# Patient Record
Sex: MALE | Employment: FULL TIME | URBAN - METROPOLITAN AREA
[De-identification: names, ages, dates, MRNs, and addresses within clinical notes are randomized per-mention and may not be internally consistent; named-entity substitution may affect disease eponyms.]

---

## 2019-07-31 ENCOUNTER — OFFICE VISIT (OUTPATIENT)
Dept: OPHTHALMOLOGY | Facility: CLINIC | Age: 39
End: 2019-07-31
Payer: OTHER MISCELLANEOUS

## 2019-07-31 DIAGNOSIS — H02.88A MEIBOMIAN GLAND DYSFUNCTION (MGD) OF UPPER AND LOWER LIDS OF BOTH EYES: ICD-10-CM

## 2019-07-31 DIAGNOSIS — Q11.1 ANOPHTHALMOS OF LEFT EYE: ICD-10-CM

## 2019-07-31 DIAGNOSIS — H02.88B MEIBOMIAN GLAND DYSFUNCTION (MGD) OF UPPER AND LOWER LIDS OF BOTH EYES: ICD-10-CM

## 2019-07-31 DIAGNOSIS — H52.01 HYPERMETROPIA OF RIGHT EYE: Primary | ICD-10-CM

## 2019-07-31 ASSESSMENT — EXTERNAL EXAM - LEFT EYE: OS_EXAM: NORMAL

## 2019-07-31 ASSESSMENT — CONF VISUAL FIELD
OS_SUPERIOR_NASAL_RESTRICTION: 1
OD_NORMAL: 1
OS_SUPERIOR_TEMPORAL_RESTRICTION: 1
OS_INFERIOR_TEMPORAL_RESTRICTION: 1
OS_INFERIOR_NASAL_RESTRICTION: 1

## 2019-07-31 ASSESSMENT — SLIT LAMP EXAM - LIDS: COMMENTS: NORMAL

## 2019-07-31 ASSESSMENT — CUP TO DISC RATIO: OD_RATIO: 0.2

## 2019-07-31 ASSESSMENT — TONOMETRY
IOP_METHOD: ICARE
OS_IOP_MMHG: PROS
OD_IOP_MMHG: 22

## 2019-07-31 ASSESSMENT — VISUAL ACUITY
CORRECTION_TYPE: GLASSES
METHOD: SNELLEN - LINEAR
OD_CC+: -1
OS_CC: XX
OD_CC: 20/25-
OD_CC: 20/20

## 2019-07-31 ASSESSMENT — REFRACTION_MANIFEST
OD_SPHERE: +0.75
OD_CYLINDER: SPHERE

## 2019-07-31 ASSESSMENT — EXTERNAL EXAM - RIGHT EYE: OD_EXAM: NORMAL

## 2019-07-31 NOTE — PROGRESS NOTES
History  HPI     Annual Eye Exam     In right eye.  Charactertized as  blurred vision.  Severity is moderate.  Response to treatment was no improvement.  Pain was noted as 0/10.              Comments     left eye prosthesis, last exam 2010  Both eyes hurt, alternating sides. Longstanding.   Using artificial tears both eyes 3 times per week.  Wears glasses full-time.  Removes prosthesis to clean daily with water. Current prosthesis is ~10 years old.  Reports blurry vision in the right eye without glasses. A little better with glasses.          Last edited by Subhash Martines, OD on 7/31/2019  5:34 PM. (History)          Assessment/Plan  (H52.01) Hypermetropia of right eye  (primary encounter diagnosis)  Comment: Hyperopia right eye   Plan: REFRACTION         Educated patient on condition and clinical findings. Dispensed spectacle prescription for full time wear. Educated patient on possibility of adaptation period, if symptoms do not improve return to clinic for further testing.   Stressed importance of full-time wear due to monocular status.    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 Mendes  Plan:  Referred to Dr. Mendes for consultation.    (H02.88A,  H02.88B) Meibomian gland dysfunction (MGD) of upper and lower lids of both eyes  Comment: Mild, minimal clinical findings, infrequently symptomatic  Plan:  Recommended artificial tears as needed for comfort.    Complete documentation of historical and exam elements from today's encounter can  be found in the full encounter summary report (not reduplicated in this progress  note). I personally obtained the chief complaint(s) and history of present illness. I  confirmed and edited as necessary the review of systems, past medical/surgical  history, family history, social history, and examination findings as documented by  others; and I examined the patient myself. I personally reviewed the relevant tests,  images, and reports as documented  above. I formulated and edited as necessary the  assessment and plan and discussed the findings and management plan with the  patient and family.    Subhash Martines OD, FAAO

## 2019-08-02 ENCOUNTER — PRE VISIT (OUTPATIENT)
Dept: OPHTHALMOLOGY | Facility: CLINIC | Age: 39
End: 2019-08-02

## 2019-08-02 NOTE — TELEPHONE ENCOUNTER
FUTURE VISIT INFORMATION      FUTURE VISIT INFORMATION:    Date: 8/26/19    Time: 7:30am    [place] [facility] [place]  REFERRAL INFORMATION:    Referring provider:  Bobbi    Referring providers clinic:  [facility]    Reason for visit/diagnosis  Anophthalmos of left eye    RECORDS REQUESTED FROM:       Clinic name Comments Records Status Imaging Status   [facility] OV/referral Bobbi 7/31/19  OV/Notes 4/20/2009-6/25/10 EPIC

## 2019-08-26 ENCOUNTER — OFFICE VISIT (OUTPATIENT)
Dept: OPHTHALMOLOGY | Facility: CLINIC | Age: 39
End: 2019-08-26
Payer: OTHER MISCELLANEOUS

## 2019-08-26 DIAGNOSIS — Q11.1 ANOPHTHALMOS OF LEFT EYE: Primary | ICD-10-CM

## 2019-08-26 ASSESSMENT — EXTERNAL EXAM - LEFT EYE: OS_EXAM: NORMAL

## 2019-08-26 ASSESSMENT — TONOMETRY
IOP_METHOD: ICARE
OD_IOP_MMHG: 20
OS_IOP_MMHG: PROS

## 2019-08-26 ASSESSMENT — CONF VISUAL FIELD
OD_NORMAL: 1
OS_INFERIOR_NASAL_RESTRICTION: 1
OS_INFERIOR_TEMPORAL_RESTRICTION: 1
OS_SUPERIOR_NASAL_RESTRICTION: 1
OS_SUPERIOR_TEMPORAL_RESTRICTION: 1

## 2019-08-26 ASSESSMENT — VISUAL ACUITY
OD_CC: 20/20
METHOD: SNELLEN - LINEAR
OD_CC+: -2
OS_CC: PROS

## 2019-08-26 ASSESSMENT — REFRACTION_WEARINGRX
OD_CYLINDER: SPHERE
OD_SPHERE: +0.75
OS_SPHERE: BALANCE

## 2019-08-26 ASSESSMENT — SLIT LAMP EXAM - LIDS: COMMENTS: NORMAL

## 2019-08-26 ASSESSMENT — EXTERNAL EXAM - RIGHT EYE: OD_EXAM: NORMAL

## 2019-08-26 NOTE — PROGRESS NOTES
Chief Complaints and History of Present Illnesses   Patient presents with     Consult For     Chief Complaint(s) and History of Present Illness(es)     Consult For     In left eye.  This started 10 years ago.  Occurring constantly.  Since   onset it is stable.  Treatments tried include no treatments.  Pain was   noted as 0/10.      Comments     Anophthalmos of the left eye.    Pt states prosthesis is maybe 10+ years old, no problems. No pain. No   problems with fit. AT PRN each eye.    Xochitl Gregorio, COT COT 7:36 AM August 26, 2019      -Jc Orosco is being seen today at the request of Referring Provider: Subhash Martines for evaluation of anophthalmia left eye with prosthesis. 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  -Doing well with current prosthesis. Takes out and cleans daily.  -Denies eye pain/irritation. Uses over the counter drops/ointment as needed  -Always wears glasses            Assessment & Plan     Jc Orosco is a 39 year old male with the following diagnoses:   1. Anophthalmos of left eye       -Monocular precautions discussed  -Continue annual f/u             Jaci Dye MD  Oculoplastics Fellow    Attending Physician Attestation:  Complete documentation of historical and exam elements from today's encounter can be found in the full encounter summary report (not reduplicated in this progress note).  I personally obtained the chief complaint(s) and history of present illness.  I confirmed and edited as necessary the review of systems, past medical/surgical history, family history, social history, and examination findings as documented by others; and I examined the patient myself.  I personally reviewed the relevant tests, images, and reports as documented above.  I formulated and edited as necessary the assessment and plan and discussed the findings and management plan with the patient and family. I personally reviewed the ophthalmic test(s)  associated with this encounter, agree with the interpretation(s) as documented by the resident/fellow, and have edited the corresponding report(s) as necessary.   -Subhash Mendes MD

## 2019-08-26 NOTE — NURSING NOTE
Chief Complaints and History of Present Illnesses   Patient presents with     Consult For     Chief Complaint(s) and History of Present Illness(es)     Consult For     Laterality: left eye    Onset: 10 years ago    Frequency: constantly    Course: stable    Treatments tried: no treatments    Pain scale: 0/10              Comments     Anophthalmos of the left eye.    Pt states prosthesis is maybe 10+ years old, no problems. No pain. No problems with fit. AT PRN each eye.    ADAMA Casarez COT 7:36 AM August 26, 2019

## 2021-04-05 ENCOUNTER — OFFICE VISIT (OUTPATIENT)
Dept: OPHTHALMOLOGY | Facility: CLINIC | Age: 41
End: 2021-04-05
Payer: OTHER MISCELLANEOUS

## 2021-04-05 DIAGNOSIS — Q11.1 ANOPHTHALMOS OF LEFT EYE: Primary | ICD-10-CM

## 2021-04-05 DIAGNOSIS — H02.88A MEIBOMIAN GLAND DYSFUNCTION (MGD) OF UPPER AND LOWER LIDS OF BOTH EYES: ICD-10-CM

## 2021-04-05 DIAGNOSIS — H02.422 MYOGENIC PTOSIS OF LEFT EYELID: ICD-10-CM

## 2021-04-05 DIAGNOSIS — H02.88B MEIBOMIAN GLAND DYSFUNCTION (MGD) OF UPPER AND LOWER LIDS OF BOTH EYES: ICD-10-CM

## 2021-04-05 PROCEDURE — 99213 OFFICE O/P EST LOW 20 MIN: CPT | Performed by: OPHTHALMOLOGY

## 2021-04-05 ASSESSMENT — REFRACTION_WEARINGRX
OD_CYLINDER: SPHERE
OD_SPHERE: +0.75
OS_SPHERE: BALANCE

## 2021-04-05 ASSESSMENT — CONF VISUAL FIELD
OS_INFERIOR_TEMPORAL_RESTRICTION: 1
OD_NORMAL: 1
OS_SUPERIOR_TEMPORAL_RESTRICTION: 1
OS_SUPERIOR_NASAL_RESTRICTION: 1
OS_INFERIOR_NASAL_RESTRICTION: 1

## 2021-04-05 ASSESSMENT — EXTERNAL EXAM - RIGHT EYE: OD_EXAM: NORMAL

## 2021-04-05 ASSESSMENT — SLIT LAMP EXAM - LIDS: COMMENTS: 2+ PTOSIS

## 2021-04-05 ASSESSMENT — VISUAL ACUITY
OD_CC: 20/20
METHOD: SNELLEN - LINEAR
CORRECTION_TYPE: GLASSES
OS_CC: XX

## 2021-04-05 ASSESSMENT — TONOMETRY
OD_IOP_MMHG: 17
IOP_METHOD: TONOPEN
OS_IOP_MMHG: XX

## 2021-04-05 ASSESSMENT — EXTERNAL EXAM - LEFT EYE: OS_EXAM: NORMAL

## 2021-04-05 NOTE — PROGRESS NOTES
Chief Complaints and History of Present Illnesses   Patient presents with     Follow Up     Anophthalmos of left eye      Chief Complaint(s) and History of Present Illness(es)     Follow Up     In left eye.  This started 1 month ago.  Associated symptoms include   discharge.  Negative for eye pain (pt notes intermittent pain that lasts   for seconds when present, he notes that it is a quick, sharp pain in the   left side. x 1 month). Additional comments: Anophthalmos of left eye               Comments     Pt notes that he has been having pain on the left side for about 1 month,   he takes pros out daily and cleans, notes some mattering/discharge from   the left side. [facility] is good cc in the RE per pt, was unable to fill last gls   rx, printed for pt and gave to him as it hasn't  yet.    Vibha Avila COT 2021 8:03 AM               Assessment & Plan     Jc Orosco is a 40 year old male with the following diagnoses:   1. Anophthalmos of left eye    2. Meibomian gland dysfunction (MGD) of upper and lower lids of both eyes    3. Myogenic ptosis of left eyelid         PLAN:  Artificial tears four times a day    Observe left upper lid ptosis for now  Return to clinic 6 months/prn            Attending Physician Attestation:  Complete documentation of historical and exam elements from today's encounter can be found in the full encounter summary report (not reduplicated in this progress note).  I personally obtained the chief complaint(s) and history of present illness.  I confirmed and edited as necessary the review of systems, past medical/surgical history, family history, social history, and examination findings as documented by others; and I examined the patient myself.  I personally reviewed the relevant tests, images, and reports as documented above.  I formulated and edited as necessary the assessment and plan and discussed the findings and management plan with the patient and family. I personally  reviewed the ophthalmic test(s) associated with this encounterand have edited the corresponding report(s) as necessary.   -Subhash Mendes MD  8:13 AM 4/5/2021

## 2021-04-05 NOTE — NURSING NOTE
Chief Complaints and History of Present Illnesses   Patient presents with     Follow Up     Anophthalmos of left eye      Chief Complaint(s) and History of Present Illness(es)     Follow Up     Laterality: left eye    Onset: 1 month ago    Associated symptoms: discharge.  Negative for eye pain (pt notes intermittent pain that lasts for seconds when present, he notes that it is a quick, sharp pain in the left side. x 1 month)    Comments: Anophthalmos of left eye               Comments     Pt notes that he has been having pain on the left side for about 1 month, he takes pros out daily and cleans, notes some mattering/discharge from the left side. [facility] is good cc in the RE per pt, was unable to fill last gls rx, printed for pt and gave to him as it hasn't  yet.    Vibha Avila COT 2021 8:03 AM

## 2021-10-14 ENCOUNTER — TELEPHONE (OUTPATIENT)
Dept: OPHTHALMOLOGY | Facility: CLINIC | Age: 41
End: 2021-10-14

## 2021-10-14 NOTE — TELEPHONE ENCOUNTER
Spoke with patient regarding rescheduling missed appointment. Rescheduled patient accordingly and patient is aware of time and location. -Per Patient with  on the call

## 2021-10-18 ENCOUNTER — VIRTUAL VISIT (OUTPATIENT)
Dept: INTERPRETER SERVICES | Facility: CLINIC | Age: 41
End: 2021-10-18

## 2021-10-18 ENCOUNTER — OFFICE VISIT (OUTPATIENT)
Dept: OPHTHALMOLOGY | Facility: CLINIC | Age: 41
End: 2021-10-18
Payer: OTHER MISCELLANEOUS

## 2021-10-18 VITALS — HEIGHT: 65 IN | BODY MASS INDEX: 23.82 KG/M2 | WEIGHT: 143 LBS

## 2021-10-18 DIAGNOSIS — H02.422 MYOGENIC PTOSIS OF LEFT EYELID: ICD-10-CM

## 2021-10-18 DIAGNOSIS — Q11.1 ANOPHTHALMOS OF LEFT EYE: Primary | ICD-10-CM

## 2021-10-18 PROCEDURE — 92285 EXTERNAL OCULAR PHOTOGRAPHY: CPT | Mod: GC | Performed by: OPHTHALMOLOGY

## 2021-10-18 PROCEDURE — 99213 OFFICE O/P EST LOW 20 MIN: CPT | Mod: GC | Performed by: OPHTHALMOLOGY

## 2021-10-18 ASSESSMENT — MIFFLIN-ST. JEOR: SCORE: 1475.52

## 2021-10-18 ASSESSMENT — TONOMETRY
OD_IOP_MMHG: 13
OS_IOP_MMHG: PROS
IOP_METHOD: ICARE

## 2021-10-18 ASSESSMENT — CONF VISUAL FIELD
OS_SUPERIOR_TEMPORAL_RESTRICTION: 1
OS_SUPERIOR_NASAL_RESTRICTION: 1
OS_INFERIOR_TEMPORAL_RESTRICTION: 1
OS_INFERIOR_NASAL_RESTRICTION: 1

## 2021-10-18 ASSESSMENT — VISUAL ACUITY
OD_SC: 20/20
OS_SC: PROSTH
OD_SC+: -1
METHOD: SNELLEN - LINEAR

## 2021-10-18 ASSESSMENT — EXTERNAL EXAM - LEFT EYE: OS_EXAM: NORMAL

## 2021-10-18 ASSESSMENT — EXTERNAL EXAM - RIGHT EYE: OD_EXAM: NORMAL

## 2021-10-18 NOTE — PROGRESS NOTES
Chief Complaints and History of Present Illnesses   Patient presents with     Follow Up     Anophthalmos of left eye.     Chief Complaint(s) and History of Present Illness(es)     Follow Up     In left eye. Additional comments: Anophthalmos of left eye.              Comments     Still getting head pain and pain around orbit of left eye. This warm   feeling around orbit and pain lasts about 3-4 minute. The duration of pain   varies to minutes or sometimes all day with no pattern of onset or   frequency. Crusty yellow discharge with upon waking with prosthetic this   is ongoing from last time.  Cleans Prosthetic daily. Still feeling dry bilaterally. Using lubricants   PRN but not daily. Does have a hard time opening [place] in the morning. Feel   like this is due to lid coming down lower. Would like to discuss surgery   to improve [place].     Di Chahal, COT COT 9:12 AM October 18, 2021       Jc Orosco is a 41 year old male who presents for follow-up regarding left anophthalmos due a work related accident in 2006. He describes mild intermittent headaches that last 3-4 minutes. Primary complaint today is that the left lid position continues to be lower than the right. Also endorses a sticky sensation in the left eyelid that resolves with artificial tear use.          Assessment & Plan     Jc Orosco is a 41 year old male with the following diagnoses:   1. Anophthalmos of left eye    2. Myogenic ptosis of left eyelid      - see  for possible adjustment  - ptosis repair if unable to fix with adjustment.     - Continue artificial tears in both eyes for dryness             Lala Miranda MD  Resident Physician, PGY-2  Department of Ophthalmology  10/18/21 9:24 AM    Attending Physician Attestation:  I have seen and examined this patient with the resident .  I have confirmed and edited as necessary the chief complaint(s), history of present illness, review of systems, relevant history, and examination findings as  documented by others.  I have personally reviewed the relevant tests, images, and reports as documented above.  I have confirmed and edited as necessary the assessment and plan and agree with this note.    - Subhash Mendes MD 10:05 AM 10/18/2021

## 2021-10-18 NOTE — NURSING NOTE
Chief Complaints and History of Present Illnesses   Patient presents with     Follow Up     Anophthalmos of left eye.     Chief Complaint(s) and History of Present Illness(es)     Follow Up     Laterality: left eye    Comments: Anophthalmos of left eye.              Comments     Still getting head pain and pain around orbit of left eye. This warm feeling around orbit and pain lasts about 3-4 minute. The duration of pain varies to minutes or sometimes all day with no pattern of onset or frequency. Crusty yellow discharge with upon waking with prosthetic this is ongoing from last time.  Cleans Prosthetic daily. Still feeling dry bilaterally. Using lubricants PRN but not daily. Does have a hard time opening [place] in the morning. Feel like this is due to lid coming down lower. Would like to discuss surgery to improve [place].     Di Chahal, COT COT 9:12 AM October 18, 2021

## 2021-10-18 NOTE — LETTER
10/18/2021         RE:  :  MRN: Jc Orosco  1980  [id number]     Dear Mark,    Your patient, Jc Orosco, returned for oculoplastic follow up. My assessment and plan are below.  For further details, please see my attached clinic note.      Assessment & Plan     Jc Orosco is a 41 year old male with the following diagnoses:   1. Anophthalmos of left eye    2. Myogenic ptosis of left eyelid      - see  for possible adjustment  - ptosis repair if unable to fix with adjustment.     - Continue artificial tears in both eyes for dryness           Again, thank you for allowing me to participate in the care of your patient.      Sincerely,    Subhash Mendes MD  Department of Ophthalmology and Visual Neurosciences  [facility]      CC: Physician No Ref-Primary  Via Fax: [phone]     Referred Self, MD  Via Fax: [phone]

## 2022-01-19 ENCOUNTER — APPOINTMENT (OUTPATIENT)
Dept: INTERPRETER SERVICES | Facility: CLINIC | Age: 42
End: 2022-01-19

## 2022-01-19 ENCOUNTER — TELEPHONE (OUTPATIENT)
Dept: OPHTHALMOLOGY | Facility: CLINIC | Age: 42
End: 2022-01-19

## 2022-01-19 NOTE — TELEPHONE ENCOUNTER
Spoke with patient regarding rescheduling due to Provider out of clinic. Rescheduled patient for late February as patient requested. Sent new appointment letter to confirmed address. -Per Patient with  on the call

## 2022-03-14 ENCOUNTER — LAB REQUISITION (OUTPATIENT)
Dept: LAB | Facility: CLINIC | Age: 42
End: 2022-03-14

## 2022-03-14 DIAGNOSIS — R30.0 DYSURIA: ICD-10-CM

## 2022-03-14 LAB — HIV 1+2 AB+HIV1 P24 AG SERPL QL IA: NONREACTIVE

## 2022-03-14 PROCEDURE — 87591 N.GONORRHOEAE DNA AMP PROB: CPT | Performed by: INTERNAL MEDICINE

## 2022-03-14 PROCEDURE — 87389 HIV-1 AG W/HIV-1&-2 AB AG IA: CPT | Performed by: INTERNAL MEDICINE

## 2022-03-14 PROCEDURE — 86780 TREPONEMA PALLIDUM: CPT | Performed by: INTERNAL MEDICINE

## 2022-03-14 PROCEDURE — 86592 SYPHILIS TEST NON-TREP QUAL: CPT | Performed by: INTERNAL MEDICINE

## 2022-03-14 PROCEDURE — 87491 CHLMYD TRACH DNA AMP PROBE: CPT | Performed by: INTERNAL MEDICINE

## 2022-03-15 LAB
C TRACH DNA SPEC QL NAA+PROBE: POSITIVE
N GONORRHOEA DNA SPEC QL NAA+PROBE: POSITIVE
T PALLIDUM AB SER QL: REACTIVE

## 2022-03-16 LAB — RPR SER QL: NONREACTIVE

## 2022-03-19 LAB — T PALLIDUM AB SER QL AGGL: REACTIVE

## 2022-08-29 ENCOUNTER — OFFICE VISIT (OUTPATIENT)
Dept: OPHTHALMOLOGY | Facility: CLINIC | Age: 42
End: 2022-08-29
Payer: OTHER MISCELLANEOUS

## 2022-08-29 VITALS — WEIGHT: 145 LBS | BODY MASS INDEX: 24.16 KG/M2 | HEIGHT: 65 IN

## 2022-08-29 DIAGNOSIS — Q11.1 ANOPHTHALMOS OF LEFT EYE: Primary | ICD-10-CM

## 2022-08-29 DIAGNOSIS — H02.422 MYOGENIC PTOSIS OF LEFT EYELID: ICD-10-CM

## 2022-08-29 PROCEDURE — 92015 DETERMINE REFRACTIVE STATE: CPT | Mod: GC | Performed by: OPHTHALMOLOGY

## 2022-08-29 PROCEDURE — 92012 INTRM OPH EXAM EST PATIENT: CPT | Mod: GC | Performed by: OPHTHALMOLOGY

## 2022-08-29 ASSESSMENT — CONF VISUAL FIELD
OS_SUPERIOR_TEMPORAL_RESTRICTION: 1
OS_INFERIOR_TEMPORAL_RESTRICTION: 1
METHOD: COUNTING FINGERS
OD_NORMAL: 1
OS_INFERIOR_NASAL_RESTRICTION: 1
OS_SUPERIOR_NASAL_RESTRICTION: 1

## 2022-08-29 ASSESSMENT — VISUAL ACUITY
OS_SC: PROSTH
METHOD: SNELLEN - LINEAR
OD_SC: 20/25

## 2022-08-29 ASSESSMENT — MARGIN REFLEX DISTANCE
OS_MRD1: 3
OD_MRD1: 3

## 2022-08-29 ASSESSMENT — EXTERNAL EXAM - LEFT EYE: OS_EXAM: NORMAL

## 2022-08-29 ASSESSMENT — REFRACTION_MANIFEST
OD_ADD: +1.25
OS_SPHERE: BALANCE
OD_SPHERE: +0.25
OD_CYLINDER: SPHERE

## 2022-08-29 ASSESSMENT — EXTERNAL EXAM - RIGHT EYE: OD_EXAM: NORMAL

## 2022-08-29 ASSESSMENT — TONOMETRY
OS_IOP_MMHG: PROS
IOP_METHOD: TONOPEN
OD_IOP_MMHG: 19

## 2022-08-29 NOTE — PROGRESS NOTES
Chief Complaints and History of Present Illnesses   Patient presents with     Follow Up     Anophthalmos left eye follow up after  visit for possible adjustment to assess need for ptosis repair pending result.      Chief Complaint(s) and History of Present Illness(es)     Follow Up     In left eye.  Associated symptoms include dryness.  Negative for eye   pain, tearing and discharge.  Treatments tried include eye drops.  Pain   was noted as 0/10. Additional comments: Anophthalmos left eye follow up   after  visit for possible adjustment to assess need for ptosis   repair pending result.               Comments     Patient states that he did see  and thing have improved after   adjustment.   Patient states he is no longer having pain since the adjustment. No   excessive discharge. Things are going well.     Does have some right upper lids spasms at times the past 2 months   intermittently when tired.     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     Attending Physician Attestation:  I have seen and examined this patient with the fellow .  I have confirmed and edited as necessary the chief complaint(s), history of present illness, review of systems, relevant history, and examination findings as documented by others.  I have personally reviewed the relevant tests, images, and reports as documented above.  I have confirmed and edited as necessary the assessment and plan and agree with this note.    - Subhash Mendes MD 8:51 AM 8/29/2022                 left anophthalmos due to work related accident in 2006.   RUL spasms for the past 2 weeks, happens about 2-3x/week and feels sharp stabbing pains and associated headache. Spasms last for about 1 minute at a time.           Assessment & Plan     Jc Orosco is a 42 year old male with the following diagnoses:   1. Anophthalmos of left eye    2. Myogenic ptosis of left eyelid       Left  anophthalmos  - Patient is happy with the left upper lid height after the prosthetic adjustment, and notes his left sided pain has since resolved.    Right upper lid spasms   - no spasms visuzlied in clinic  - no prior episodes aside from past 2 months  - monitor for now. Discussed decreasing caffeine intake, decreasing stress, increasing sleep  - start ATs TID    Follow up 1 year with oculoplastics or sooner ANICETO Guido MD  Oculoplastics Fellow

## 2022-08-29 NOTE — NURSING NOTE
Chief Complaints and History of Present Illnesses   Patient presents with     Follow Up     Anophthalmos left eye follow up after  visit for possible adjustment to assess need for ptosis repair pending result.      Chief Complaint(s) and History of Present Illness(es)     Follow Up     Laterality: left eye    Associated symptoms: dryness.  Negative for eye pain, tearing and discharge    Treatments tried: eye drops    Pain scale: 0/10    Comments: Anophthalmos left eye follow up after  visit for possible adjustment to assess need for ptosis repair pending result.               Comments     Patient states that he did see  and thing have improved after adjustment.   Patient states he is no longer having pain since the adjustment. No excessive discharge. Things are going well.     Does have some right upper lids spasms at times the past 2 months intermittently when tired.     ADAMA Hou COT 8:21 AM August 29, 2022           Used [place] interpreting Ipad.

## 2023-02-07 ENCOUNTER — TELEPHONE (OUTPATIENT)
Dept: OPHTHALMOLOGY | Facility: CLINIC | Age: 43
End: 2023-02-07

## 2023-02-07 NOTE — TELEPHONE ENCOUNTER
Rescheduled patient for next available Appointment in September due to provider is out of Clinic. Sent Rescheduled Appointment Letter to address in Chart.